# Patient Record
Sex: FEMALE | Race: WHITE | NOT HISPANIC OR LATINO | URBAN - METROPOLITAN AREA
[De-identification: names, ages, dates, MRNs, and addresses within clinical notes are randomized per-mention and may not be internally consistent; named-entity substitution may affect disease eponyms.]

---

## 2023-09-05 ENCOUNTER — INPATIENT (INPATIENT)
Facility: HOSPITAL | Age: 88
LOS: 2 days | Discharge: NURSING, REHAB OR LONG-TERM CARE | DRG: 206 | End: 2023-09-08
Attending: SURGERY | Admitting: SURGERY
Payer: MEDICARE

## 2023-09-05 VITALS
TEMPERATURE: 98 F | DIASTOLIC BLOOD PRESSURE: 70 MMHG | OXYGEN SATURATION: 98 % | RESPIRATION RATE: 18 BRPM | HEIGHT: 66 IN | SYSTOLIC BLOOD PRESSURE: 155 MMHG | WEIGHT: 119.93 LBS | HEART RATE: 69 BPM

## 2023-09-05 PROCEDURE — 99285 EMERGENCY DEPT VISIT HI MDM: CPT

## 2023-09-05 PROCEDURE — 99053 MED SERV 10PM-8AM 24 HR FAC: CPT

## 2023-09-06 ENCOUNTER — TRANSCRIPTION ENCOUNTER (OUTPATIENT)
Age: 88
End: 2023-09-06

## 2023-09-06 DIAGNOSIS — Z98.890 OTHER SPECIFIED POSTPROCEDURAL STATES: Chronic | ICD-10-CM

## 2023-09-06 DIAGNOSIS — S22.49XA MULTIPLE FRACTURES OF RIBS, UNSPECIFIED SIDE, INITIAL ENCOUNTER FOR CLOSED FRACTURE: ICD-10-CM

## 2023-09-06 LAB
ALBUMIN SERPL ELPH-MCNC: 3.9 G/DL — SIGNIFICANT CHANGE UP (ref 3.3–5)
ALP SERPL-CCNC: 48 U/L — SIGNIFICANT CHANGE UP (ref 40–120)
ALT FLD-CCNC: 11 U/L — SIGNIFICANT CHANGE UP (ref 10–45)
ANION GAP SERPL CALC-SCNC: 9 MMOL/L — SIGNIFICANT CHANGE UP (ref 5–17)
APPEARANCE UR: CLEAR — SIGNIFICANT CHANGE UP
AST SERPL-CCNC: 24 U/L — SIGNIFICANT CHANGE UP (ref 10–40)
BASOPHILS # BLD AUTO: 0.04 K/UL — SIGNIFICANT CHANGE UP (ref 0–0.2)
BASOPHILS NFR BLD AUTO: 0.4 % — SIGNIFICANT CHANGE UP (ref 0–2)
BILIRUB SERPL-MCNC: 0.5 MG/DL — SIGNIFICANT CHANGE UP (ref 0.2–1.2)
BILIRUB UR-MCNC: NEGATIVE — SIGNIFICANT CHANGE UP
BUN SERPL-MCNC: 15 MG/DL — SIGNIFICANT CHANGE UP (ref 7–23)
CALCIUM SERPL-MCNC: 9.3 MG/DL — SIGNIFICANT CHANGE UP (ref 8.4–10.5)
CHLORIDE SERPL-SCNC: 105 MMOL/L — SIGNIFICANT CHANGE UP (ref 96–108)
CO2 SERPL-SCNC: 26 MMOL/L — SIGNIFICANT CHANGE UP (ref 22–31)
COLOR SPEC: COLORLESS — SIGNIFICANT CHANGE UP
CREAT SERPL-MCNC: 0.71 MG/DL — SIGNIFICANT CHANGE UP (ref 0.5–1.3)
DIFF PNL FLD: NEGATIVE — SIGNIFICANT CHANGE UP
EGFR: 79 ML/MIN/1.73M2 — SIGNIFICANT CHANGE UP
EOSINOPHIL # BLD AUTO: 0.04 K/UL — SIGNIFICANT CHANGE UP (ref 0–0.5)
EOSINOPHIL NFR BLD AUTO: 0.4 % — SIGNIFICANT CHANGE UP (ref 0–6)
ETHANOL SERPL-MCNC: <10 MG/DL — SIGNIFICANT CHANGE UP (ref 0–10)
GLUCOSE SERPL-MCNC: 125 MG/DL — HIGH (ref 70–99)
GLUCOSE UR QL: NEGATIVE — SIGNIFICANT CHANGE UP
HCT VFR BLD CALC: 42 % — SIGNIFICANT CHANGE UP (ref 34.5–45)
HGB BLD-MCNC: 13.4 G/DL — SIGNIFICANT CHANGE UP (ref 11.5–15.5)
IMM GRANULOCYTES NFR BLD AUTO: 0.5 % — SIGNIFICANT CHANGE UP (ref 0–0.9)
KETONES UR-MCNC: SIGNIFICANT CHANGE UP
LEUKOCYTE ESTERASE UR-ACNC: NEGATIVE — SIGNIFICANT CHANGE UP
LYMPHOCYTES # BLD AUTO: 1.5 K/UL — SIGNIFICANT CHANGE UP (ref 1–3.3)
LYMPHOCYTES # BLD AUTO: 15.6 % — SIGNIFICANT CHANGE UP (ref 13–44)
MCHC RBC-ENTMCNC: 30 PG — SIGNIFICANT CHANGE UP (ref 27–34)
MCHC RBC-ENTMCNC: 31.9 GM/DL — LOW (ref 32–36)
MCV RBC AUTO: 94 FL — SIGNIFICANT CHANGE UP (ref 80–100)
MONOCYTES # BLD AUTO: 0.58 K/UL — SIGNIFICANT CHANGE UP (ref 0–0.9)
MONOCYTES NFR BLD AUTO: 6 % — SIGNIFICANT CHANGE UP (ref 2–14)
NEUTROPHILS # BLD AUTO: 7.4 K/UL — SIGNIFICANT CHANGE UP (ref 1.8–7.4)
NEUTROPHILS NFR BLD AUTO: 77.1 % — HIGH (ref 43–77)
NITRITE UR-MCNC: NEGATIVE — SIGNIFICANT CHANGE UP
NRBC # BLD: 0 /100 WBCS — SIGNIFICANT CHANGE UP (ref 0–0)
PH UR: 7 — SIGNIFICANT CHANGE UP (ref 5–8)
PLATELET # BLD AUTO: 199 K/UL — SIGNIFICANT CHANGE UP (ref 150–400)
POTASSIUM SERPL-MCNC: 4.7 MMOL/L — SIGNIFICANT CHANGE UP (ref 3.5–5.3)
POTASSIUM SERPL-SCNC: 4.7 MMOL/L — SIGNIFICANT CHANGE UP (ref 3.5–5.3)
PROT SERPL-MCNC: 6.6 G/DL — SIGNIFICANT CHANGE UP (ref 6–8.3)
PROT UR-MCNC: NEGATIVE — SIGNIFICANT CHANGE UP
RBC # BLD: 4.47 M/UL — SIGNIFICANT CHANGE UP (ref 3.8–5.2)
RBC # FLD: 13.3 % — SIGNIFICANT CHANGE UP (ref 10.3–14.5)
SODIUM SERPL-SCNC: 140 MMOL/L — SIGNIFICANT CHANGE UP (ref 135–145)
SP GR SPEC: 1.03 — HIGH (ref 1.01–1.02)
UROBILINOGEN FLD QL: NEGATIVE — SIGNIFICANT CHANGE UP
WBC # BLD: 9.61 K/UL — SIGNIFICANT CHANGE UP (ref 3.8–10.5)
WBC # FLD AUTO: 9.61 K/UL — SIGNIFICANT CHANGE UP (ref 3.8–10.5)

## 2023-09-06 PROCEDURE — 73060 X-RAY EXAM OF HUMERUS: CPT | Mod: 26,RT

## 2023-09-06 PROCEDURE — 73090 X-RAY EXAM OF FOREARM: CPT | Mod: 26,RT

## 2023-09-06 PROCEDURE — 73700 CT LOWER EXTREMITY W/O DYE: CPT | Mod: 26,LT

## 2023-09-06 PROCEDURE — 99222 1ST HOSP IP/OBS MODERATE 55: CPT

## 2023-09-06 PROCEDURE — 73562 X-RAY EXAM OF KNEE 3: CPT | Mod: 26,LT

## 2023-09-06 PROCEDURE — 71260 CT THORAX DX C+: CPT | Mod: 26,MA

## 2023-09-06 PROCEDURE — 72125 CT NECK SPINE W/O DYE: CPT | Mod: 26,MA

## 2023-09-06 PROCEDURE — 76377 3D RENDER W/INTRP POSTPROCES: CPT | Mod: 26

## 2023-09-06 PROCEDURE — 73030 X-RAY EXAM OF SHOULDER: CPT | Mod: 26,RT

## 2023-09-06 PROCEDURE — 70450 CT HEAD/BRAIN W/O DYE: CPT | Mod: 26,MA

## 2023-09-06 PROCEDURE — 73080 X-RAY EXAM OF ELBOW: CPT | Mod: 26,RT

## 2023-09-06 PROCEDURE — 74177 CT ABD & PELVIS W/CONTRAST: CPT | Mod: 26,MA

## 2023-09-06 PROCEDURE — 73110 X-RAY EXAM OF WRIST: CPT | Mod: 26,RT

## 2023-09-06 RX ORDER — IBUPROFEN 200 MG
200 TABLET ORAL EVERY 6 HOURS
Refills: 0 | Status: DISCONTINUED | OUTPATIENT
Start: 2023-09-06 | End: 2023-09-08

## 2023-09-06 RX ORDER — LIDOCAINE 4 G/100G
1 CREAM TOPICAL EVERY 24 HOURS
Refills: 0 | Status: DISCONTINUED | OUTPATIENT
Start: 2023-09-06 | End: 2023-09-08

## 2023-09-06 RX ORDER — IBUPROFEN 200 MG
400 TABLET ORAL ONCE
Refills: 0 | Status: COMPLETED | OUTPATIENT
Start: 2023-09-06 | End: 2023-09-06

## 2023-09-06 RX ORDER — TRAMADOL HYDROCHLORIDE 50 MG/1
25 TABLET ORAL EVERY 4 HOURS
Refills: 0 | Status: DISCONTINUED | OUTPATIENT
Start: 2023-09-06 | End: 2023-09-08

## 2023-09-06 RX ORDER — ACETAMINOPHEN 500 MG
975 TABLET ORAL EVERY 6 HOURS
Refills: 0 | Status: DISCONTINUED | OUTPATIENT
Start: 2023-09-06 | End: 2023-09-08

## 2023-09-06 RX ORDER — SODIUM CHLORIDE 9 MG/ML
250 INJECTION INTRAMUSCULAR; INTRAVENOUS; SUBCUTANEOUS ONCE
Refills: 0 | Status: COMPLETED | OUTPATIENT
Start: 2023-09-06 | End: 2023-09-06

## 2023-09-06 RX ORDER — HEPARIN SODIUM 5000 [USP'U]/ML
5000 INJECTION INTRAVENOUS; SUBCUTANEOUS EVERY 8 HOURS
Refills: 0 | Status: DISCONTINUED | OUTPATIENT
Start: 2023-09-06 | End: 2023-09-07

## 2023-09-06 RX ORDER — ACETAMINOPHEN 500 MG
650 TABLET ORAL ONCE
Refills: 0 | Status: COMPLETED | OUTPATIENT
Start: 2023-09-06 | End: 2023-09-06

## 2023-09-06 RX ADMIN — Medication 650 MILLIGRAM(S): at 02:12

## 2023-09-06 RX ADMIN — Medication 975 MILLIGRAM(S): at 23:34

## 2023-09-06 RX ADMIN — Medication 400 MILLIGRAM(S): at 02:12

## 2023-09-06 RX ADMIN — HEPARIN SODIUM 5000 UNIT(S): 5000 INJECTION INTRAVENOUS; SUBCUTANEOUS at 21:13

## 2023-09-06 RX ADMIN — LIDOCAINE 1 PATCH: 4 CREAM TOPICAL at 18:02

## 2023-09-06 RX ADMIN — Medication 975 MILLIGRAM(S): at 18:02

## 2023-09-06 RX ADMIN — SODIUM CHLORIDE 250 MILLILITER(S): 9 INJECTION INTRAMUSCULAR; INTRAVENOUS; SUBCUTANEOUS at 02:16

## 2023-09-06 RX ADMIN — HEPARIN SODIUM 5000 UNIT(S): 5000 INJECTION INTRAVENOUS; SUBCUTANEOUS at 13:18

## 2023-09-06 NOTE — ED ADULT NURSE NOTE - NSFALLHARMRISKINTERV_ED_ALL_ED

## 2023-09-06 NOTE — H&P ADULT - NSHPLABSRESULTS_GEN_ALL_CORE
LABS:                       13.4   9.61  )-----------( 199      ( 06 Sep 2023 02:20 )             42.0     09-06    140  |  105  |  15  ----------------------------<  125<H>  4.7   |  26  |  0.71    Ca    9.3      06 Sep 2023 02:20    TPro  6.6  /  Alb  3.9  /  TBili  0.5  /  DBili  x   /  AST  24  /  ALT  11  /  AlkPhos  48  09-06      CAPILLARY BLOOD GLUCOSE    IMAGING:     CT Head/ C Spine:     CT Head: No acute intracranial hemorrhage or calvarial fracture.    CT cervical spine: No acute cervical spine fracture or evidence of   traumatic malalignment.        CT Chest/ Abdomen/ Pelvis:     CHEST:  LUNGS AND LARGE AIRWAYS: Patent central airways. No pulmonary nodules.  PLEURA: No pleural effusion. No pneumothorax.  VESSELS: Within normal limits.  HEART: Heart size is normal. No pericardial effusion.  MEDIASTINUM AND BRIANNA: No lymphadenopathy.  CHEST WALL AND LOWER NECK: Acute right anterior third rib fracture.    ABDOMEN AND PELVIS:  LIVER: Within normal limits.  BILE DUCTS: Normal caliber.  GALLBLADDER: Cholecystectomy.  SPLEEN: Within normal limits.  PANCREAS: Within normal limits.  ADRENALS: Within normal limits.  KIDNEYS/URETERS: Right renal cyst. No hydronephrosis.    BLADDER: Within normal limits.  REPRODUCTIVE ORGANS: Uterus and adnexa within normal limits.    BOWEL: No bowel obstruction. Colonic diverticulosis. Appendix is normal.  PERITONEUM: No ascites.  VESSELS: Atherosclerotic changes.  RETROPERITONEUM/LYMPH NODES: No lymphadenopathy.  ABDOMINAL WALL: Within normal limits.  BONES: Degenerative changes.    Left Knee XR:     Acute right anterior third rib fracture. No pneumothorax.    No acute intra-abdominal pathology on CT.

## 2023-09-06 NOTE — PHYSICAL THERAPY INITIAL EVALUATION ADULT - ADDITIONAL COMMENTS
Pt reports visiting son from [place], will be staying at [facility], +elevator access, no stairs, +walkin shower

## 2023-09-06 NOTE — CONSULT NOTE ADULT - SUBJECTIVE AND OBJECTIVE BOX
HPI  89yFemale w/ c/o L knee pain and R elbow pain s/p mechanical fall this. Unable to bear weight in the LLE since the fall. Denies headstrike or LOC. Denies numbness/tingling in the LLE or RUE. Denies any other trauma/injuries at this time. At baseline, community ambulator w/o assistive devices. Patient is RHD.     ROS  Negative unless otherwise specified in HPI.    PAST MEDICAL & SURGICAL Hx  PAST MEDICAL & SURGICAL HISTORY:  No pertinent past medical history      H/O left wrist surgery      H/O right wrist surgery          MEDICATIONS  Home Medications:      ALLERGIES  No Known Allergies      FAMILY Hx  FAMILY HISTORY:      SOCIAL Hx  Social History:      VITALS  Vital Signs Last 24 Hrs  T(C): 36.4 (06 Sep 2023 07:16), Max: 36.8 (06 Sep 2023 06:21)  T(F): 97.6 (06 Sep 2023 07:16), Max: 98.3 (06 Sep 2023 06:21)  HR: 62 (06 Sep 2023 07:16) (61 - 72)  BP: 146/71 (06 Sep 2023 07:16) (136/62 - 155/70)  BP(mean): 91 (06 Sep 2023 07:16) (91 - 91)  RR: 16 (06 Sep 2023 07:16) (16 - 18)  SpO2: 98% (06 Sep 2023 07:16) (97% - 99%)    Parameters below as of 06 Sep 2023 07:16  Patient On (Oxygen Delivery Method): room air        PHYSICAL EXAM  Gen: Lying in bed, NAD  Resp: No increased WOB  LLE:  Superficial abbrasion over anterior knee, otherwise skin intact, +edema and +ecchymosis over L knee  +TTP over L knee, no palpable patellar defect, no TTP along remainder of extremity; compartments soft  Limited ROM of knee 2/2 pain  Able to SLR  Motor: TA/EHL/GS/FHL intact  Sensory: DP/SP/Tib/Holly/Saph SILT  +DP pulse, WWP    RIGHT Upper Extremity:   Skin intact, no erythema  +mild elbow ecchymosis & edema  NO gross deformity  +TTP over medial and lateral elbow  limited pronation/supination, elbow flex/ext from 40 to 90 degrees 2/2 pain and swelling   NTTP over the bony prominences of the shoulder/wrist/hand/fingers  Painless passive/active ROM of the shoulder/elbow/wrist/hand/fingers  C5-T1 SILT  Motor grossly intact throughout axillary/musculocutaneous/radial/median/ulnar/AIN/PIN nerves  + radial pulse  Compartments soft and compressible     Secondary survey:  No TTP along spine or other extremities, pelvis grossly stable, SILT and compartments soft throughout    LABS                        13.4   9.61  )-----------( 199      ( 06 Sep 2023 02:20 )             42.0     09-06    140  |  105  |  15  ----------------------------<  125<H>  4.7   |  26  |  0.71    Ca    9.3      06 Sep 2023 02:20    TPro  6.6  /  Alb  3.9  /  TBili  0.5  /  DBili  x   /  AST  24  /  ALT  11  /  AlkPhos  48  09-06        IMAGING  XRs: L patella fx, R radial head fx    ASSESSMENT & PLAN  89yFemale w/ L patella fx s/p immobilization & R radial head fx.  -WBAT LLE in a [facility]  -NWB RUE in sling  -PLEASE OBTAIN FORMAL ELBOW XRAYS INCLUDING A RADIOCAPITELLAR VIEW  -pain control  -ice/cold compress, elevation  -PT/OT  -no acute ortho surgery at this time  -f/u outpt with Dr. Rodriguez within 1 week, call office for appt

## 2023-09-06 NOTE — ED PROVIDER NOTE - ATTENDING CONTRIBUTION TO CARE
MD Osullivan:  patient seen and evaluated with the PA.  I was present for key portions of the History and Physical, and I agree with the Impression and Plan.      Patient is a 89-year-old female brought in by son for evaluation of right-sided chest, shoulder, wrist pain status post witnessed mechanical fall.  Patient tripped over the same the sidewalk falling onto her right side + head trauma without LOC.  Patient denies anticoagulant use.  Main complaint is right-sided chest pain and right upper extremity pain    Vital signs: Within normal limits   General: Elderly female, overall well-appearing until left upper extremity manipulated  Head: Right temporal abrasion.  Pupils equal round reactive to light, extraocular movement is intact     Neck: Supple mild CT 7 T1 tenderness to palpation no step-off  Chest wall: Positive tenderness to palpation right upper chest wall ribs 2 through 5, no crepitus, no ecchymosis  Abdomen: Soft nondistended nontender  Extremities: Right upper extremity grossly within normal limits but exquisitely tender to palpation wrist forearm proximal humerus  Neurologic: Awake alert and oriented x3 and strength within normal limits bilaterally      Medical decision making:   History and physical concerning for multiple rib fractures in an elderly patient.  Patient may also have right upper extremity or left patellar injury both of which warrant x-ray evaluation     Degree of discomfort on exam and age necessitate CT head C-spine chest given that the mechanism was traumatic we will follow through with abdominal imaging as part of trauma protocol.    Tylenol and ibuprofen for pain at this time. MD Osullivan:  patient seen and evaluated with the PA.  I was present for key portions of the History and Physical, and I agree with the Impression and Plan.      Patient is a 89-year-old female brought in by son for evaluation of right-sided chest, shoulder, wrist pain status post witnessed mechanical fall.  Patient tripped over the same the sidewalk falling onto her right side + head trauma without LOC.  Patient denies anticoagulant use.  Main complaint is right-sided chest pain and right upper extremity pain    Vital signs: Within normal limits   General: Elderly female, overall well-appearing until left upper extremity manipulated  Head: Right temporal abrasion.  Pupils equal round reactive to light, extraocular movement is intact     Neck: Supple mild CT 7 T1 tenderness to palpation no step-off  Chest wall: Positive tenderness to palpation right upper chest wall ribs 2 through 5, no crepitus, no ecchymosis  Abdomen: Soft nondistended nontender  Extremities: Right upper extremity grossly within normal limits but exquisitely tender to palpation wrist forearm proximal humerus  L knee TTP over patella  Neurologic: Awake alert and oriented x3 and strength within normal limits bilaterally      Medical decision making:   History and physical concerning for multiple rib fractures in an elderly patient.  Patient may also have right upper extremity or left patellar injury both of which warrant x-ray evaluation     Degree of discomfort on exam and age necessitate CT head C-spine chest given that the mechanism was traumatic we will follow through with abdominal imaging as part of trauma protocol.    Tylenol and ibuprofen for pain at this time.

## 2023-09-06 NOTE — DISCHARGE NOTE PROVIDER - HOSPITAL COURSE
89F with no significant PMHx who presents after a mechanical fall. Patient states that she was walking on the sidewalk and tripped. +HS, -LOC. Patient currently endorsing pain in the R chest and shoulder. Patient ambulated after fall. Patient denies headache, N/V, SOB. Patient currently lives alone at home, ambulates without assistance.  Injuries notable for R 3rd rib fracture and L patellar fracture.  She was admitted to the acute care surgical service.  She was evaluated by ortho who recommended WBAT LLE in a [facility], ZANDER RUE in sling, repeat xrays included radiocapitellar view.  No acute ortho surgery at this time, f/u outpt with Dr. Rodriguez within 1 week.  She was evaluated by PT/OT who recommended subacute rehab.   89F with no significant PMHx who presents after a mechanical fall. Patient states that she was walking on the sidewalk and tripped. +HS, -LOC. Patient currently endorsing pain in the R chest and shoulder. Patient ambulated after fall. Patient denies headache, N/V, SOB. Patient currently lives alone at home, ambulates without assistance.  Injuries notable for R 3rd rib fracture and L patellar fracture.  She was admitted to the acute care surgical service.  She was evaluated by ortho who recommended WBAT LLE in a [employer] RUTRISTON in sling, repeat xrays included radiocapitellar view which showed -------------------------------- 89F with no significant PMHx who presents after a mechanical fall. Patient states that she was walking on the sidewalk and tripped. +HS, -LOC. Patient currently endorsing pain in the R chest and shoulder. Patient ambulated after fall. Patient denies headache, N/V, SOB. Patient currently lives alone at home, ambulates without assistance.  Injuries notable for R 3rd rib fracture and L patellar fracture.  She was admitted to the acute care surgical service.  She was evaluated by ortho who recommended WBAT LLE in a Z[employer] RUE in sling, repeat xrays included radiocapitellar view which showed Redemonstration of an acute impacted radial head/neck fracture. 89F with no significant PMHx who presents after a mechanical fall. Patient states that she was walking on the sidewalk and tripped. +HS, -LOC. Patient currently endorsing pain in the R chest and shoulder. Patient ambulated after fall. Patient denies headache, N/V, SOB. Patient currently lives alone at home, ambulates without assistance.  Injuries notable for R 3rd rib fracture and L patellar fracture.  She was admitted to the acute care surgical service.  She was evaluated by ortho who recommended WBAT LLE in a [facility], NWB RUE in sling, repeat xrays included radiocapitellar view which showed Redemonstration of an acute impacted radial head/neck fracture.  She was evaluated by PT who recommended subacute rehab after discharge.  She is ambulating with assistance, is voiding, tolerating a diet and is stable for discharge home.  She will follow-up with Dr. Rodriguez within 1-2 weeks.  She will follow-up with trauma as needed.

## 2023-09-06 NOTE — ED PROVIDER NOTE - OBJECTIVE STATEMENT
89 y/o female, denies pmh, presents to the ER s/p trip and fall. states she tripped over the sidewalk and fell onto her right side. states has pain to the right side of her chest, shoulder and wrist. states she did hit her head. no LOC. denies any AC use. denies f/n/v/d, SOB, dizziness, abdominal pain, urinary symptoms.

## 2023-09-06 NOTE — H&P ADULT - NSHPPHYSICALEXAM_GEN_ALL_CORE
Primary Survey  A - Airway intact  B - Bilateral breath sounds and good chest rise  C - Initially BP: 136/62 (09-06-23 @ 06:21), Palpable pulses in all extremities  D - GCS 15 on arrival  Exposure obtained      Secondary survey  Gen: NAD  HEENT: NC/AT  CV: s1, s2, RRR  Pulm: CTA B/L  Chest: TTP along right chest   Abd: Soft, ND, NT, no rebound, no guarding  Ext: Palp radial b/l, palp DP b/l, left knee swelling   Back: no TTP, no palpable runoff, stepoff, or deformity

## 2023-09-06 NOTE — DISCHARGE NOTE PROVIDER - CARE PROVIDER_API CALL
Preston Rodriguez  Orthopaedic Surgery  [address], [address]  [place] [address]  Phone: [phone]  Fax: [phone]  Follow Up Time: 1 week

## 2023-09-06 NOTE — PHYSICAL THERAPY INITIAL EVALUATION ADULT - PERTINENT HX OF CURRENT PROBLEM, REHAB EVAL
Pt is a 89F admitted to [facility] on with no significant PMHx who presents after a mechanical fall. Patient states that she was walking on the sidewalk and tripped. +HS, -LOC. Pt currently endorsing pain in the R chest and shoulder. Pt ambulated after fall. Pt denies headache, N/V, SOB. Pt currently lives alone at home, ambulates without assistance. Hospital course: Injuries notable for R 3rd rib fracture and L patellar fracture. multimodal pain control, pulling 2000 on IS, ortho c/s for patellar fx. xray L knee: Cannot exclude inferior patellar pole fracture due to technique-related limitations. Additional imaging may be required (repeat knee radiographs or CT) to further evaluate this finding. CT L knee: Acute nondisplaced transversely oriented fracture along the caudal third of the patella. Moderate lipohemarthrosis. Per ortho, WBAT LLE in a [facility], NWB RUE in sling; no acute ortho sx at this time. RUE humeruse fx: Suspect acute nondisplaced fracture at the radial head/neck junction as described.  CT Head: No acute intracranial hemorrhage or calvarial fracture. CT cervical spine: No acute cervical spine fracture or evidence of   traumatic malalignment. CT chest: Acute right anterior third rib fracture. No pneumothorax. No acute intra-abdominal pathology on CT.

## 2023-09-06 NOTE — PHYSICAL THERAPY INITIAL EVALUATION ADULT - GENERAL OBSERVATIONS, REHAB EVAL
Pt is s/p fall, +L patella fx WBAT in B[facility]KI per ortho, +radius head fx RUE NWB in sling, +3rd R rib fx. Pt received supine in bed on 7T, +IVL, +sling RUE, +KI LLE, +scab to R knee

## 2023-09-06 NOTE — OCCUPATIONAL THERAPY INITIAL EVALUATION ADULT - LIVES WITH, PROFILE
Pt states she is visiting from [place], will be staying with son post discharge in apartment +elevator access, walk in shower. Pt I in ADLs and ambulation prior to admission

## 2023-09-06 NOTE — PATIENT PROFILE ADULT - FALL HARM RISK - HARM RISK INTERVENTIONS

## 2023-09-06 NOTE — CONSULT NOTE ADULT - ATTENDING COMMENTS
mild fx that are minimally displaced.  WB and ROM as tolerated knee and elbow.  ROM and ambulation will be painful, but it is safe.  Mobilize w PT

## 2023-09-06 NOTE — PHYSICAL THERAPY INITIAL EVALUATION ADULT - PATIENT PROFILE REVIEW, REHAB EVAL
Spoke with pt and his mom regarding discharge plan. He denies need for DME equipment. Instructed if he gets home and changes his mind the items are available at local drug stores and both pt and mom verbalize understanding. No further needs voiced. yes

## 2023-09-06 NOTE — OCCUPATIONAL THERAPY INITIAL EVALUATION ADULT - PERTINENT HX OF CURRENT PROBLEM, REHAB EVAL
89F with no significant PMHx who presents after a mechanical fall. Patient states that she was walking on the sidewalk and tripped. +HS, -LOC. Patient currently endorsing pain in the R chest and shoulder. Patient ambulated after fall. Patient denies headache, N/V, SOB. Patient currently lives alone at home, ambulates without assistance.  Hospital course: Injuries notable for R 3rd rib fracture and L patellar fracture. multimodal pain control, pulling 2000 on IS, ortho c/s for patellar fx. xray L knee: Cannot exclude inferior patellar pole fracture due to technique-related limitations. Additional imaging may be required (repeat knee radiographs or CT) to further evaluate this finding. CT L knee: Acute nondisplaced transversely oriented fracture along the caudal third of the patella. Moderate lipohemarthrosis. Per ortho, WBAT LLE in a [facility], NWB RUE in sling; no acute ortho sx at this time. RUE humerus fx: Suspect acute nondisplaced fracture at the radial head/neck junction as described.  CT Head: No acute intracranial hemorrhage or calvarial fracture. CT cervical spine: No acute cervical spine fracture or evidence of   traumatic malalignment. CT chest: Acute right anterior third rib fracture. No pneumothorax. No acute intra-abdominal pathology on CT.

## 2023-09-06 NOTE — DISCHARGE NOTE PROVIDER - NSDCCPCAREPLAN_GEN_ALL_CORE_FT
PRINCIPAL DISCHARGE DIAGNOSIS  Diagnosis: Rib fractures  Assessment and Plan of Treatment: You may take tylenol and motrin around the clock.  Please stagger these medications.  You may take narcotic pain medication for breakthrough (severe) pain not relieved with medications.  -You can call the acute care surgery office with any questions related to your hospital stay.   -You can take Tylenol as needed for mild to moderate pain Please be sure not exceed 4000mg of acetaminophen(Tylenol) in a 24 hour period.   -You can purchase over-the-counter Lidocaine patches such as SalonPas for topical pain relief. You can apply these to your chest wall once daily.  -If you experience fever > 101, pain not controlled with oxycodone, persistent nausea/vomiting please call your surgeon or return to emergency room immediately.    -Use your incentive spirometer every hour for deep breathing exercises.      SECONDARY DISCHARGE DIAGNOSES  Diagnosis: Patellar fracture  Assessment and Plan of Treatment: Wear knee brace as instructed, no weight bearing L leg  Wear sling as instructed, no weight bearing right arm     PRINCIPAL DISCHARGE DIAGNOSIS  Diagnosis: Rib fractures  Assessment and Plan of Treatment: You may take tylenol and motrin around the clock.  Please stagger these medications.  You may take narcotic pain medication for breakthrough (severe) pain not relieved with medications.  -You can call the acute care surgery office with any questions related to your hospital stay.   -You can take Tylenol as needed for mild to moderate pain Please be sure not exceed 4000mg of acetaminophen(Tylenol) in a 24 hour period.   -You can purchase over-the-counter Lidocaine patches such as SalonPas for topical pain relief. You can apply these to your chest wall once daily.  -If you experience fever > 101, pain not controlled with oxycodone, persistent nausea/vomiting please call your surgeon or return to emergency room immediately.    -Use your incentive spirometer every hour for deep breathing exercises.      SECONDARY DISCHARGE DIAGNOSES  Diagnosis: Patellar fracture  Assessment and Plan of Treatment: Wear knee brace as instructed, weight-bearing as tolerated L leg  Wear sling as instructed, no weight bearing right arm

## 2023-09-06 NOTE — ED PROVIDER NOTE - PHYSICAL EXAMINATION
head: small abrasion to temporal region  neck supple.   cardio: normal s1,s2. RRR  pulm: CTABL. +ttp to right anterior chest wall to ribs 2-5.   no ecchymosis, no crepitus head: small abrasion to temporal region  neck supple.   cardio: normal s1,s2. RRR  pulm: CTABL. +ttp to right anterior chest wall to ribs 2-5.   no ecchymosis, no crepitus  MSK: +ttp to right upper extremity. cant fully  lift right arm due to pain   left UE winl. no ttp.  +ttp to left patella

## 2023-09-06 NOTE — DISCHARGE NOTE PROVIDER - NSDCMRMEDTOKEN_GEN_ALL_CORE_FT
acetaminophen 325 mg oral tablet: 3 tab(s) orally every 6 hours  ibuprofen 200 mg oral tablet: 1 tab(s) orally every 6 hours As needed Mild Pain (1 - 3)  latanoprost 0.005% ophthalmic solution: 1 drop(s) to each affected eye once a day (at bedtime)  lidocaine 4% topical film: Apply topically to affected area once a day as needed for rib fracture pain  traMADol 50 mg oral tablet: 0.5 tab(s) orally every 4 hours As needed Moderate Pain (4 - 6)

## 2023-09-06 NOTE — ED ADULT NURSE NOTE - OBJECTIVE STATEMENT
89y F A&Ox4 c/o fall. Pt states she was walking on the sidewalk and tripped over uneven pavement landing on her right side. Pt states she hit her head however did not pass out and recalls events. Upon assessment pt endorsing pain to right side of head, right shoulder, right chest wall, right arm, right thigh and b/l knees. No abrasions, ecchymosis or edema noted how ever right arm and right chest wall painful to palpation. Denies any anticoagulation use, LOC, blurry vision/dizziness, numbness, bleeding, N/V/D/SOB/Gi/Gu symptoms. No PMH. PSH of B/L wrist repair. VSS

## 2023-09-06 NOTE — H&P ADULT - HISTORY OF PRESENT ILLNESS
Trauma Surgery Consult     CC: Patient is a 89y old  Female who presents with a chief complaint of mechanical fall     HPI: 89F with no significant PMHx who presents after a mechanical fall. Patient states that she was walking on the sidewalk and tripped. +HS, -LOC. Patient currently endorsing pain in the R chest and shoulder. Patient ambulated after fall. Patient denies headache, N/V, SOB. Patient currently lives alone at home, ambulates without assistance.        Trauma Surgery Consult     CC: Patient is a 89y old  Female who presents with a chief complaint of mechanical fall     HPI: 89F with no significant PMHx who presents after a mechanical fall. Patient states that she was walking on the sidewalk and tripped. +HS, -LOC. Patient currently endorsing pain in the R chest and shoulder. Patient ambulated after fall. Patient denies headache, N/V, SOB. Patient currently lives alone at home, ambulates without assistance.

## 2023-09-06 NOTE — H&P ADULT - ATTENDING COMMENTS
Patient is a 89 year old female with no significant medical history who presents to [facility] s/p fall. Primary survey was intact. Secondary survey with multiple abrasions. Imaging reveals a right 3rd rib fx, a right radial head fx and a left patella fx. Pt was seen by orthopedics. Please see their note for recommendations. Pt is pulling 2L on spirometry    Labs reviewed    A/p  S/p fall  Multiple orthopedic injuries  Start multimodal pain control  PT evaluation  Discharge planning

## 2023-09-06 NOTE — ED PROVIDER NOTE - CLINICAL SUMMARY MEDICAL DECISION MAKING FREE TEXT BOX
Medical decision making:   History and physical concerning for multiple rib fractures in an elderly patient.  Patient may also have right upper extremity or left patellar injury both of which warrant x-ray evaluation     Degree of discomfort on exam and age necessitate CT head C-spine chest given that the mechanism was traumatic we will follow through with abdominal imaging as part of trauma protocol.    Tylenol and ibuprofen for pain at this time.

## 2023-09-06 NOTE — ED PROVIDER NOTE - PROGRESS NOTE DETAILS
Elinor Duncan PA-C: imaging reviewed. rib fracture and concern for patella fracture. ortho and trauma paged. spoke to trauma pending consult. pending ortho call back. discussed with Dr. Osullivan. Received sign out for patient this am, during sign out trauma service called advising they will accept patient under their service. Patient agreeable to admission. Ortho paged by overnight team but no call back, re-paged this am. Will place admission and discuss questioned patella fracture with Ortho when service calls back  Jihan Chisholm PA-C

## 2023-09-06 NOTE — DISCHARGE NOTE PROVIDER - NSDCFUADDAPPT_GEN_ALL_CORE_FT
No follow-up with trauma/acute care surgery is required.  If you have questions, please call office at [phone].

## 2023-09-06 NOTE — H&P ADULT - ASSESSMENT
ASSESSMENT: 89F with no significant PMHx who presents after a mechanical fall. Injuries notable for R 3rd rib fracture and L patellar fracture.     PLAN:  -Admit to Trauma/ ACS   -Multimodal pain control  -Currently pulling 2000 on IS   -Ortho consult for patellar fracture   -PT evaluation   -Discussed with Dr. Freda Louis, PGY2   Trauma/ ACS, [id number]

## 2023-09-06 NOTE — PHYSICAL THERAPY INITIAL EVALUATION ADULT - NSPTDISCHREC_GEN_A_CORE
DC subacute rehab for strengthening, improve overall functional independence and ADLs/higher level balance tasks; if pt to go home, recommend home PT services, assist from son for mobility/ADLs, recommend transport WC with elevating leg rest, shower chair, CM to be notified./Sub-acute Rehab

## 2023-09-07 LAB
ANION GAP SERPL CALC-SCNC: 10 MMOL/L — SIGNIFICANT CHANGE UP (ref 5–17)
BUN SERPL-MCNC: 12 MG/DL — SIGNIFICANT CHANGE UP (ref 7–23)
CALCIUM SERPL-MCNC: 8.6 MG/DL — SIGNIFICANT CHANGE UP (ref 8.4–10.5)
CHLORIDE SERPL-SCNC: 107 MMOL/L — SIGNIFICANT CHANGE UP (ref 96–108)
CO2 SERPL-SCNC: 24 MMOL/L — SIGNIFICANT CHANGE UP (ref 22–31)
CREAT SERPL-MCNC: 0.68 MG/DL — SIGNIFICANT CHANGE UP (ref 0.5–1.3)
EGFR: 81 ML/MIN/1.73M2 — SIGNIFICANT CHANGE UP
GLUCOSE SERPL-MCNC: 115 MG/DL — HIGH (ref 70–99)
HCT VFR BLD CALC: 41.8 % — SIGNIFICANT CHANGE UP (ref 34.5–45)
HGB BLD-MCNC: 13.5 G/DL — SIGNIFICANT CHANGE UP (ref 11.5–15.5)
MAGNESIUM SERPL-MCNC: 2.1 MG/DL — SIGNIFICANT CHANGE UP (ref 1.6–2.6)
MCHC RBC-ENTMCNC: 30.1 PG — SIGNIFICANT CHANGE UP (ref 27–34)
MCHC RBC-ENTMCNC: 32.3 GM/DL — SIGNIFICANT CHANGE UP (ref 32–36)
MCV RBC AUTO: 93.1 FL — SIGNIFICANT CHANGE UP (ref 80–100)
NRBC # BLD: 0 /100 WBCS — SIGNIFICANT CHANGE UP (ref 0–0)
PHOSPHATE SERPL-MCNC: 2.8 MG/DL — SIGNIFICANT CHANGE UP (ref 2.5–4.5)
PLATELET # BLD AUTO: 193 K/UL — SIGNIFICANT CHANGE UP (ref 150–400)
POTASSIUM SERPL-MCNC: 4 MMOL/L — SIGNIFICANT CHANGE UP (ref 3.5–5.3)
POTASSIUM SERPL-SCNC: 4 MMOL/L — SIGNIFICANT CHANGE UP (ref 3.5–5.3)
RBC # BLD: 4.49 M/UL — SIGNIFICANT CHANGE UP (ref 3.8–5.2)
RBC # FLD: 13.2 % — SIGNIFICANT CHANGE UP (ref 10.3–14.5)
SODIUM SERPL-SCNC: 141 MMOL/L — SIGNIFICANT CHANGE UP (ref 135–145)
WBC # BLD: 6.71 K/UL — SIGNIFICANT CHANGE UP (ref 3.8–10.5)
WBC # FLD AUTO: 6.71 K/UL — SIGNIFICANT CHANGE UP (ref 3.8–10.5)

## 2023-09-07 PROCEDURE — 99222 1ST HOSP IP/OBS MODERATE 55: CPT

## 2023-09-07 RX ORDER — LATANOPROST 0.05 MG/ML
1 SOLUTION/ DROPS OPHTHALMIC; TOPICAL AT BEDTIME
Refills: 0 | Status: DISCONTINUED | OUTPATIENT
Start: 2023-09-07 | End: 2023-09-08

## 2023-09-07 RX ORDER — ENOXAPARIN SODIUM 100 MG/ML
40 INJECTION SUBCUTANEOUS EVERY 24 HOURS
Refills: 0 | Status: DISCONTINUED | OUTPATIENT
Start: 2023-09-07 | End: 2023-09-08

## 2023-09-07 RX ADMIN — LIDOCAINE 1 PATCH: 4 CREAM TOPICAL at 17:34

## 2023-09-07 RX ADMIN — Medication 975 MILLIGRAM(S): at 12:15

## 2023-09-07 RX ADMIN — Medication 975 MILLIGRAM(S): at 05:07

## 2023-09-07 RX ADMIN — Medication 975 MILLIGRAM(S): at 06:07

## 2023-09-07 RX ADMIN — Medication 975 MILLIGRAM(S): at 11:15

## 2023-09-07 RX ADMIN — HEPARIN SODIUM 5000 UNIT(S): 5000 INJECTION INTRAVENOUS; SUBCUTANEOUS at 05:07

## 2023-09-07 RX ADMIN — ENOXAPARIN SODIUM 40 MILLIGRAM(S): 100 INJECTION SUBCUTANEOUS at 11:16

## 2023-09-07 RX ADMIN — Medication 975 MILLIGRAM(S): at 00:34

## 2023-09-07 RX ADMIN — Medication 975 MILLIGRAM(S): at 18:33

## 2023-09-07 RX ADMIN — Medication 975 MILLIGRAM(S): at 17:33

## 2023-09-08 ENCOUNTER — TRANSCRIPTION ENCOUNTER (OUTPATIENT)
Age: 88
End: 2023-09-08

## 2023-09-08 VITALS
SYSTOLIC BLOOD PRESSURE: 129 MMHG | OXYGEN SATURATION: 96 % | HEART RATE: 66 BPM | DIASTOLIC BLOOD PRESSURE: 71 MMHG | RESPIRATION RATE: 18 BRPM | TEMPERATURE: 98 F
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
## 2023-09-08 DIAGNOSIS — S22.39XA FRACTURE OF ONE RIB, UNSPECIFIED SIDE, INITIAL ENCOUNTER FOR CLOSED FRACTURE: ICD-10-CM

## 2023-09-08 DIAGNOSIS — S82.009A UNSPECIFIED FRACTURE OF UNSPECIFIED PATELLA, INITIAL ENCOUNTER FOR CLOSED FRACTURE: ICD-10-CM

## 2023-09-08 PROBLEM — Z78.9 OTHER SPECIFIED HEALTH STATUS: Chronic | Status: ACTIVE | Noted: 2023-09-06

## 2023-09-08 PROCEDURE — 81003 URINALYSIS AUTO W/O SCOPE: CPT

## 2023-09-08 PROCEDURE — 73090 X-RAY EXAM OF FOREARM: CPT

## 2023-09-08 PROCEDURE — 97116 GAIT TRAINING THERAPY: CPT

## 2023-09-08 PROCEDURE — 76377 3D RENDER W/INTRP POSTPROCES: CPT

## 2023-09-08 PROCEDURE — 73562 X-RAY EXAM OF KNEE 3: CPT

## 2023-09-08 PROCEDURE — 36415 COLL VENOUS BLD VENIPUNCTURE: CPT

## 2023-09-08 PROCEDURE — 74177 CT ABD & PELVIS W/CONTRAST: CPT | Mod: MA

## 2023-09-08 PROCEDURE — 85027 COMPLETE CBC AUTOMATED: CPT

## 2023-09-08 PROCEDURE — 83735 ASSAY OF MAGNESIUM: CPT

## 2023-09-08 PROCEDURE — 99285 EMERGENCY DEPT VISIT HI MDM: CPT | Mod: 25

## 2023-09-08 PROCEDURE — 99222 1ST HOSP IP/OBS MODERATE 55: CPT

## 2023-09-08 PROCEDURE — 73700 CT LOWER EXTREMITY W/O DYE: CPT

## 2023-09-08 PROCEDURE — 73080 X-RAY EXAM OF ELBOW: CPT

## 2023-09-08 PROCEDURE — 71260 CT THORAX DX C+: CPT | Mod: MA

## 2023-09-08 PROCEDURE — 84100 ASSAY OF PHOSPHORUS: CPT

## 2023-09-08 PROCEDURE — 72125 CT NECK SPINE W/O DYE: CPT | Mod: MA

## 2023-09-08 PROCEDURE — 80048 BASIC METABOLIC PNL TOTAL CA: CPT

## 2023-09-08 PROCEDURE — 73060 X-RAY EXAM OF HUMERUS: CPT

## 2023-09-08 PROCEDURE — 85025 COMPLETE CBC W/AUTO DIFF WBC: CPT

## 2023-09-08 PROCEDURE — 97161 PT EVAL LOW COMPLEX 20 MIN: CPT

## 2023-09-08 PROCEDURE — 96374 THER/PROPH/DIAG INJ IV PUSH: CPT

## 2023-09-08 PROCEDURE — 70450 CT HEAD/BRAIN W/O DYE: CPT | Mod: MA

## 2023-09-08 PROCEDURE — 73110 X-RAY EXAM OF WRIST: CPT

## 2023-09-08 PROCEDURE — 80307 DRUG TEST PRSMV CHEM ANLYZR: CPT

## 2023-09-08 PROCEDURE — 73030 X-RAY EXAM OF SHOULDER: CPT

## 2023-09-08 PROCEDURE — 97110 THERAPEUTIC EXERCISES: CPT

## 2023-09-08 PROCEDURE — 80053 COMPREHEN METABOLIC PANEL: CPT

## 2023-09-08 PROCEDURE — 97165 OT EVAL LOW COMPLEX 30 MIN: CPT

## 2023-09-08 RX ORDER — INFLUENZA VIRUS VACCINE 15; 15; 15; 15 UG/.5ML; UG/.5ML; UG/.5ML; UG/.5ML
0.7 SUSPENSION INTRAMUSCULAR ONCE
Refills: 0 | Status: DISCONTINUED | OUTPATIENT
Start: 2023-09-08 | End: 2023-09-08

## 2023-09-08 RX ORDER — LIDOCAINE 4 G/100G
1 CREAM TOPICAL
Qty: 0 | Refills: 0 | DISCHARGE
Start: 2023-09-08

## 2023-09-08 RX ORDER — IBUPROFEN 200 MG
1 TABLET ORAL
Qty: 0 | Refills: 0 | DISCHARGE
Start: 2023-09-08

## 2023-09-08 RX ORDER — LATANOPROST 0.05 MG/ML
1 SOLUTION/ DROPS OPHTHALMIC; TOPICAL
Qty: 0 | Refills: 0 | DISCHARGE
Start: 2023-09-08

## 2023-09-08 RX ORDER — ACETAMINOPHEN 500 MG
3 TABLET ORAL
Qty: 0 | Refills: 0 | DISCHARGE
Start: 2023-09-08

## 2023-09-08 RX ORDER — TRAMADOL HYDROCHLORIDE 50 MG/1
0.5 TABLET ORAL
Qty: 0 | Refills: 0 | DISCHARGE
Start: 2023-09-08

## 2023-09-08 RX ADMIN — Medication 975 MILLIGRAM(S): at 12:30

## 2023-09-08 RX ADMIN — LIDOCAINE 1 PATCH: 4 CREAM TOPICAL at 17:39

## 2023-09-08 RX ADMIN — Medication 975 MILLIGRAM(S): at 05:13

## 2023-09-08 RX ADMIN — Medication 975 MILLIGRAM(S): at 06:13

## 2023-09-08 RX ADMIN — ENOXAPARIN SODIUM 40 MILLIGRAM(S): 100 INJECTION SUBCUTANEOUS at 11:21

## 2023-09-08 RX ADMIN — LIDOCAINE 1 PATCH: 4 CREAM TOPICAL at 18:20

## 2023-09-08 RX ADMIN — Medication 975 MILLIGRAM(S): at 11:23

## 2023-09-08 RX ADMIN — Medication 975 MILLIGRAM(S): at 17:38

## 2023-09-08 NOTE — CONSULT NOTE ADULT - PROBLEM SELECTOR RECOMMENDATION 2
Pt was seen by Ortho team and no surgical interventions was recommended  Pain control   pt is to be DC to rehab today as perPT rec   pt is to follow up with the Ortho and PCP and Rehab team once DC

## 2023-09-08 NOTE — CONSULT NOTE ADULT - NSCONSULTADDITIONALINFOA_GEN_ALL_CORE
DVT prophylaxis as per ortho , pain management as per Ortho       Sandie Byrne   Hospitalist   available on TEAMS

## 2023-09-08 NOTE — DISCHARGE NOTE NURSING/CASE MANAGEMENT/SOCIAL WORK - NSDCPEFALRISK_GEN_ALL_CORE
For information on Fall & Injury Prevention, visit: [email or web address] OR  [email or web address] OR  https://www.cdc.gov/steadi/patient.html

## 2023-09-08 NOTE — PROGRESS NOTE ADULT - SUBJECTIVE AND OBJECTIVE BOX
Trauma Surgery Daily Progress Note    Subjective:   Patient seen at bedside this AM. Reports feeling well, pain well controlled on regimen, pulling 1500 on IS this AM. She denies chest pain, SOB. Tolerating diet without N/V.       Objective:  Vital Signs  T(C): 36.4 (09-07 @ 04:29), Max: 36.9 (09-07 @ 00:07)  HR: 59 (09-07 @ 04:29) (59 - 86)  BP: 134/75 (09-07 @ 04:29) (112/64 - 158/78)  RR: 18 (09-07 @ 04:29) (18 - 18)  SpO2: 96% (09-07 @ 04:29) (94% - 98%)      Physical Exam:  GEN: resting in bed comfortably in NAD  RESP: no increased WOB  ABD: soft, non-distended, non-tender without rebound tenderness or guarding  EXTR: warm, well-perfused without gross deformities; spontaneous movement in b/l U/L extrem  NEURO: AAOx4    Labs:                        13.5   6.71  )-----------( 193      ( 07 Sep 2023 06:50 )             41.8   09-06    140  |  105  |  15  ----------------------------<  125<H>  4.7   |  26  |  0.71    Ca    9.3      06 Sep 2023 02:20    TPro  6.6  /  Alb  3.9  /  TBili  0.5  /  DBili  x   /  AST  24  /  ALT  11  /  AlkPhos  48  09-06    CAPILLARY BLOOD GLUCOSE          Medications:   MEDICATIONS  (STANDING):  acetaminophen     Tablet .. 975 milliGRAM(s) Oral every 6 hours  heparin   Injectable 5000 Unit(s) SubCutaneous every 8 hours  lidocaine   4% Patch 1 Patch Transdermal every 24 hours    MEDICATIONS  (PRN):  ibuprofen  Tablet. 200 milliGRAM(s) Oral every 6 hours PRN Mild Pain (1 - 3)  traMADol 25 milliGRAM(s) Oral every 4 hours PRN Moderate Pain (4 - 6)      Imaging:  < from: CT Cervical Spine No Cont (09.06.23 @ 03:05) >  CT Head: No acute intracranial hemorrhage or calvarial fracture.    CT cervical spine: No acute cervical spine fracture or evidence of   traumatic malalignment.    < end of copied text >  < from: CT Chest w/ IV Cont (09.06.23 @ 03:05) >  Acute right anterior third rib fracture. No pneumothorax.    No acute intra-abdominal pathology on CT.    < end of copied text >    < from: CT 3D Reconstruct w/ Workstation (09.06.23 @ 09:34) >  Acute nondisplaced transversely oriented fracture along the caudal third  of the patella. Moderate lipohemarthrosis.    < end of copied text >  < from: CT Knee No Cont, Left (09.06.23 @ 09:34) >    Acute nondisplaced transversely oriented fracture along the caudal third  of the patella. Moderate lipohemarthrosis.    < end of copied text >  < from: Xray Shoulder 2 Views, Right (09.06.23 @ 04:54) >  Suspect acute nondisplaced fracture at the radial head/neck junction as   described. Please evaluate forpoint tenderness at this location.    < end of copied text >      
Trauma Surgery Daily Progress Note    Subjective:   Patient seen at bedside this AM. Resting comfortably, no complaints. Denies chest pain, SOB. Tolerating diet without N/V. Awaiting [facility] ([facility] discharge today.    24h Events:   - Overnight, no acute events    Objective:  Vital Signs  T(C): 36.4 (09-08 @ 07:56), Max: 36.7 (09-07 @ 20:23)  HR: 60 (09-08 @ 07:56) (60 - 68)  BP: 151/79 (09-08 @ 07:56) (126/71 - 158/73)  RR: 18 (09-08 @ 07:56) (18 - 20)  SpO2: 94% (09-08 @ 07:56) (94% - 98%)      Physical Exam:  GEN: resting in bed comfortably in NAD  RESP: no increased WOB  ABD: soft, non-distended, non-tender without rebound tenderness or guarding  EXTR: warm, well-perfused without gross deformities; spontaneous movement in b/l U/L extrem  NEURO: AAOx4    Labs:                        13.5   6.71  )-----------( 193      ( 07 Sep 2023 06:50 )             41.8   09-07    141  |  107  |  12  ----------------------------<  115<H>  4.0   |  24  |  0.68    Ca    8.6      07 Sep 2023 06:54  Phos  2.8     09-07  Mg     2.1     09-07      CAPILLARY BLOOD GLUCOSE          Medications:   MEDICATIONS  (STANDING):  acetaminophen     Tablet .. 975 milliGRAM(s) Oral every 6 hours  enoxaparin Injectable 40 milliGRAM(s) SubCutaneous every 24 hours  latanoprost 0.005% Ophthalmic Solution 1 Drop(s) Both EYES at bedtime  lidocaine   4% Patch 1 Patch Transdermal every 24 hours    MEDICATIONS  (PRN):  ibuprofen  Tablet. 200 milliGRAM(s) Oral every 6 hours PRN Mild Pain (1 - 3)  traMADol 25 milliGRAM(s) Oral every 4 hours PRN Moderate Pain (4 - 6)

## 2023-09-08 NOTE — CONSULT NOTE ADULT - ASSESSMENT
89F with no significant PMHx who was admitted to the hosp after a mechanical fall. Patient states that she was walking on the sidewalk and tripped. +HS, -LOC. Patient currently endorsing pain in the R chest and shoulder. Patient ambulated after fall. Patient denies headache, N/V, SOB. Patient currently lives alone at home, ambulates without assistance.  Injuries notable for R 3rd rib fracture and L patellar fracture.  She was admitted to the acute care surgical service.  She was evaluated by ortho who recommended WBAT LLE in a BJKI, NWB RUE in sling, repeat xrays included radiocapitellar view which showed Redemonstration of an acute impacted radial head/neck fracture.  She was evaluated by PT who recommended subacute rehab after discharge.  She is ambulating with assistance, is voiding, tolerating a diet and is stable for discharge home.  She will follow-up with Dr. Rodriguez within 1-2 weeks.  She will follow-up with trauma as needed.   89F with no significant PMHx who was admitted to the hosp after a mechanical fall. Patient states that she was walking on the sidewalk and tripped. +HS, -LOC.  Injuries notable for R 3rd rib fracture and L patellar fracture.  She was admitted to the acute care surgical service.  She was evaluated by ortho who recommended WBAT LLE in a BJKI, NWB RUE in sling, repeat xrays included radiocapitellar view which showed Redemonstration of an acute impacted radial head/neck fracture.  She was evaluated by PT who recommended subacute rehab after discharge.

## 2023-09-08 NOTE — CONSULT NOTE ADULT - PROBLEM SELECTOR RECOMMENDATION 9
Monitor SPO2   Pain control   pt is to be DC to rehab today as perPT rec   pt is to follow up with the Ortho and PCP and Rehab team once DC

## 2023-09-08 NOTE — DISCHARGE NOTE NURSING/CASE MANAGEMENT/SOCIAL WORK - PATIENT PORTAL LINK FT
You can access the FollowMyHealth Patient Portal offered by  by registering at the following website: [email or web address]. By joining [email or web address]’s FollowMyHealth portal, you will also be able to view your health information using other applications (apps) compatible with our system.

## 2023-09-08 NOTE — PROGRESS NOTE ADULT - ASSESSMENT
89F with no significant PMHx who presents after a mechanical fall. Injuries notable for R 3rd rib fracture, non-displaced R. non-displaced radial head fracture, L patellar fracture. Patient recovering well on floor.    PLAN:  -Multimodal pain control  -Ortho recs: WBAT LLE in a BJKI, NWB RUE in sling, 1 week outpatient f/u  -PT-- [facility]   -Case management, patient is visiting from [place], okay per herself and son to be at [place] until stable to travel  -D/c today    Trauma Surgery  [phone]
89F with no significant PMHx who presents after a mechanical fall. Injuries notable for R 3rd rib fracture, non-displaced R. non-displaced radial head fracture, L patellar fracture. Patient recovering well on floor.    PLAN:  -Multimodal pain control  -Ortho recs: WBAT LLE in a BJKI, NWB RUE in sling, 1 week outpatient f/u  -PT-- [facility]   -Case management, patient is visiting from [place], discussion regarding [facility] location    Trauma Surgery  [phone]

## 2023-09-08 NOTE — DISCHARGE NOTE NURSING/CASE MANAGEMENT/SOCIAL WORK - NSDCFUADDAPPT_GEN_ALL_CORE_FT
No follow-up with trauma/acute care surgery is required.  If you have questions, please call office at [phone].

## 2023-09-08 NOTE — CONSULT NOTE ADULT - SUBJECTIVE AND OBJECTIVE BOX
Patient is a 89y old  Female who presents with a chief complaint of Fall (08 Sep 2023 09:05)      SUBJECTIVE / OVERNIGHT EVENTS:    89F with no significant PMHx who was admitted to the hosp after a mechanical fall. Patient states that she was walking on the sidewalk and tripped. +HS, -LOC. Patient currently endorsing pain in the R chest and shoulder. Patient ambulated after fall. Patient denies headache, N/V, SOB. Patient currently lives alone at home, ambulates without assistance.  Injuries notable for R 3rd rib fracture and L patellar fracture.  She was admitted to the acute care surgical service.  She was evaluated by ortho who recommended WBAT LLE in a BJKI, NWB RUE in sling, repeat xrays included radiocapitellar view which showed Redemonstration of an acute impacted radial head/neck fracture.  She was evaluated by PT who recommended subacute rehab after discharge.        ADDITIONAL REVIEW OF SYSTEMS: Negative except for above    MEDICATIONS  (STANDING):  acetaminophen     Tablet .. 975 milliGRAM(s) Oral every 6 hours  enoxaparin Injectable 40 milliGRAM(s) SubCutaneous every 24 hours  influenza  Vaccine (HIGH DOSE) 0.7 milliLiter(s) IntraMuscular once  latanoprost 0.005% Ophthalmic Solution 1 Drop(s) Both EYES at bedtime  lidocaine   4% Patch 1 Patch Transdermal every 24 hours    MEDICATIONS  (PRN):  ibuprofen  Tablet. 200 milliGRAM(s) Oral every 6 hours PRN Mild Pain (1 - 3)  traMADol 25 milliGRAM(s) Oral every 4 hours PRN Moderate Pain (4 - 6)      CAPILLARY BLOOD GLUCOSE        I&O's Summary    07 Sep 2023 07:01  -  08 Sep 2023 07:00  --------------------------------------------------------  IN: 620 mL / OUT: 0 mL / NET: 620 mL    08 Sep 2023 07:01  -  08 Sep 2023 14:30  --------------------------------------------------------  IN: 360 mL / OUT: 0 mL / NET: 360 mL        PHYSICAL EXAM:  Vital Signs Last 24 Hrs  T(C): 36.3 (08 Sep 2023 12:09), Max: 36.7 (07 Sep 2023 20:23)  T(F): 97.4 (08 Sep 2023 12:09), Max: 98 (07 Sep 2023 20:23)  HR: 67 (08 Sep 2023 12:09) (60 - 68)  BP: 146/75 (08 Sep 2023 12:09) (126/71 - 151/79)  BP(mean): --  RR: 18 (08 Sep 2023 12:09) (18 - 18)  SpO2: 96% (08 Sep 2023 12:09) (94% - 96%)    Parameters below as of 08 Sep 2023 12:09  Patient On (Oxygen Delivery Method): room air        PHYSICAL EXAM:  GENERAL: NAD, well-developed  HEAD:  Atraumatic, Normocephalic  EYES:  conjunctiva and sclera clear  NECK: Supple, No JVD  CHEST/LUNG: Clear to auscultation bilaterally; No wheeze  HEART: Regular rate and rhythm; No murmurs, rubs, or gallops  ABDOMEN: Soft, Nontender, Nondistended; Bowel sounds present  EXTREMITIES:  2+ Peripheral Pulses, No clubbing, cyanosis, or edema  PSYCH: AAOx3        LABS:                        13.5   6.71  )-----------( 193      ( 07 Sep 2023 06:50 )             41.8     09-07    141  |  107  |  12  ----------------------------<  115<H>  4.0   |  24  |  0.68    Ca    8.6      07 Sep 2023 06:54  Phos  2.8     09-07  Mg     2.1     09-07            Urinalysis Basic - ( 07 Sep 2023 06:54 )    Color: x / Appearance: x / SG: x / pH: x  Gluc: 115 mg/dL / Ketone: x  / Bili: x / Urobili: x   Blood: x / Protein: x / Nitrite: x   Leuk Esterase: x / RBC: x / WBC x   Sq Epi: x / Non Sq Epi: x / Bacteria: x          RADIOLOGY & ADDITIONAL TESTS:    Imaging Personally Reviewed:    Electrocardiogram Personally Reviewed:    COORDINATION OF CARE:  Care Discussed with Consultants/Other Providers [Y/N]:  Prior or Outpatient Records Reviewed [Y/N]:     Patient is a 89y old  Female who presents with a chief complaint of Fall (08 Sep 2023 09:05)      SUBJECTIVE / OVERNIGHT EVENTS:    89F with no significant PMHx who was admitted to the hosp after a mechanical fall. Patient states that she was walking on the sidewalk and tripped. +HS, -LOC.  Injuries notable for R 3rd rib fracture and L patellar fracture.  She was admitted to the acute care surgical service.  She was evaluated by ortho who recommended WBAT LLE in a [facility], NW RUE in sling, repeat xrays included radiocapitellar view which showed Redemonstration of an acute impacted radial head/neck fracture.  She was evaluated by PT who recommended subacute rehab after discharge.  Patient offers no complaints       ADDITIONAL REVIEW OF SYSTEMS: Negative except for above    MEDICATIONS  (STANDING):  acetaminophen     Tablet .. 975 milliGRAM(s) Oral every 6 hours  enoxaparin Injectable 40 milliGRAM(s) SubCutaneous every 24 hours  influenza  Vaccine (HIGH DOSE) 0.7 milliLiter(s) IntraMuscular once  latanoprost 0.005% Ophthalmic Solution 1 Drop(s) Both EYES at bedtime  lidocaine   4% Patch 1 Patch Transdermal every 24 hours    MEDICATIONS  (PRN):  ibuprofen  Tablet. 200 milliGRAM(s) Oral every 6 hours PRN Mild Pain (1 - 3)  traMADol 25 milliGRAM(s) Oral every 4 hours PRN Moderate Pain (4 - 6)      CAPILLARY BLOOD GLUCOSE        I&O's Summary    07 Sep 2023 07:01  -  08 Sep 2023 07:00  --------------------------------------------------------  IN: 620 mL / OUT: 0 mL / NET: 620 mL    08 Sep 2023 07:01  -  08 Sep 2023 14:30  --------------------------------------------------------  IN: 360 mL / OUT: 0 mL / NET: 360 mL        PHYSICAL EXAM:  Vital Signs Last 24 Hrs  T(C): 36.3 (08 Sep 2023 12:09), Max: 36.7 (07 Sep 2023 20:23)  T(F): 97.4 (08 Sep 2023 12:09), Max: 98 (07 Sep 2023 20:23)  HR: 67 (08 Sep 2023 12:09) (60 - 68)  BP: 146/75 (08 Sep 2023 12:09) (126/71 - 151/79)  BP(mean): --  RR: 18 (08 Sep 2023 12:09) (18 - 18)  SpO2: 96% (08 Sep 2023 12:09) (94% - 96%)    Parameters below as of 08 Sep 2023 12:09  Patient On (Oxygen Delivery Method): room air        PHYSICAL EXAM:  GENERAL: NAD, well-developed  HEAD:  Atraumatic, Normocephalic  EYES:  conjunctiva and sclera clear  NECK: Supple, No JVD  CHEST/LUNG: Clear to auscultation bilaterally; No wheeze  HEART: Regular rate and rhythm; No murmurs, rubs, or gallops  ABDOMEN: Soft, Nontender, Nondistended; Bowel sounds present  EXTREMITIES:  2+ Peripheral Pulses, No clubbing, cyanosis, or edema  PSYCH: AAOx3        LABS:                        13.5   6.71  )-----------( 193      ( 07 Sep 2023 06:50 )             41.8     09-07    141  |  107  |  12  ----------------------------<  115<H>  4.0   |  24  |  0.68    Ca    8.6      07 Sep 2023 06:54  Phos  2.8     09-07  Mg     2.1     09-07            Urinalysis Basic - ( 07 Sep 2023 06:54 )    Color: x / Appearance: x / SG: x / pH: x  Gluc: 115 mg/dL / Ketone: x  / Bili: x / Urobili: x   Blood: x / Protein: x / Nitrite: x   Leuk Esterase: x / RBC: x / WBC x   Sq Epi: x / Non Sq Epi: x / Bacteria: x          RADIOLOGY & ADDITIONAL TESTS:    Imaging Personally Reviewed:    Electrocardiogram Personally Reviewed:    COORDINATION OF CARE:  Care Discussed with Consultants/Other Providers [Y/N]:  Prior or Outpatient Records Reviewed [Y/N]:

## 2023-09-20 PROBLEM — Z00.00 ENCOUNTER FOR PREVENTIVE HEALTH EXAMINATION: Status: ACTIVE | Noted: 2023-09-20

## 2023-09-26 ENCOUNTER — APPOINTMENT (OUTPATIENT)
Dept: ORTHOPEDIC SURGERY | Facility: CLINIC | Age: 88
End: 2023-09-26
Payer: MEDICARE

## 2023-09-26 DIAGNOSIS — S52.124A NONDISPLACED FRACTURE OF HEAD OF RIGHT RADIUS, INITIAL ENCOUNTER FOR CLOSED FRACTURE: ICD-10-CM

## 2023-09-26 DIAGNOSIS — S82.035A NONDISPLACED TRANSVERSE FRACTURE OF LEFT PATELLA, INITIAL ENCOUNTER FOR CLOSED FRACTURE: ICD-10-CM

## 2023-09-26 PROCEDURE — 73562 X-RAY EXAM OF KNEE 3: CPT | Mod: LT

## 2023-09-26 PROCEDURE — 99214 OFFICE O/P EST MOD 30 MIN: CPT

## 2023-09-26 PROCEDURE — 73080 X-RAY EXAM OF ELBOW: CPT | Mod: RT

## 2023-09-29 PROBLEM — S82.035A CLOSED NONDISPLACED TRANSVERSE FRACTURE OF LEFT PATELLA, INITIAL ENCOUNTER: Status: ACTIVE | Noted: 2023-09-26

## 2023-09-29 PROBLEM — S52.124A CLOSED NONDISPLACED FRACTURE OF HEAD OF RIGHT RADIUS, INITIAL ENCOUNTER: Status: ACTIVE | Noted: 2023-09-26

## 2025-02-23 NOTE — PROGRESS NOTE ADULT - NSPROGADDITIONALINFOA_GEN_ALL_CORE
ATTENDING ATTESTATION  I have discussed this patient on multidisciplinary rounds thismorning. I have reviewed all new labs, imaging and reports. I have participated in formulating the plan for the day, and have read and agree with the history, ROS, exam, assessment and plan as stated above.     Accepted to [facility] rehab, pending transfer today.     Estephania Phillips M.D., M.S.  Dept of Trauma, Acute and Critical Care unilateral UE support on portable monitor